# Patient Record
Sex: FEMALE | URBAN - METROPOLITAN AREA
[De-identification: names, ages, dates, MRNs, and addresses within clinical notes are randomized per-mention and may not be internally consistent; named-entity substitution may affect disease eponyms.]

---

## 2018-11-08 ENCOUNTER — HOSPITAL ENCOUNTER (EMERGENCY)
Facility: HOSPITAL | Age: 38
Discharge: HOME | End: 2018-11-08
Attending: EMERGENCY MEDICINE
Payer: MEDICAID

## 2018-11-08 VITALS
HEART RATE: 97 BPM | DIASTOLIC BLOOD PRESSURE: 78 MMHG | RESPIRATION RATE: 16 BRPM | TEMPERATURE: 99 F | WEIGHT: 140 LBS | SYSTOLIC BLOOD PRESSURE: 116 MMHG | OXYGEN SATURATION: 100 %

## 2018-11-08 DIAGNOSIS — M25.521 RIGHT ELBOW PAIN: Primary | ICD-10-CM

## 2018-11-08 DIAGNOSIS — M25.519 SHOULDER PAIN: ICD-10-CM
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
## 2018-11-08 DIAGNOSIS — M25.551 RIGHT HIP PAIN: ICD-10-CM

## 2018-11-08 LAB
B-HCG UR QL: NEGATIVE
CTP QC/QA: YES

## 2018-11-08 PROCEDURE — 81025 URINE PREGNANCY TEST: CPT | Performed by: PHYSICIAN ASSISTANT

## 2018-11-08 PROCEDURE — 25000003 PHARM REV CODE 250: Performed by: PHYSICIAN ASSISTANT

## 2018-11-08 PROCEDURE — 99284 EMERGENCY DEPT VISIT MOD MDM: CPT

## 2018-11-08 PROCEDURE — 99284 EMERGENCY DEPT VISIT MOD MDM: CPT | Mod: ,,, | Performed by: PHYSICIAN ASSISTANT

## 2018-11-08 RX ORDER — PREDNISONE 20 MG/1
20 TABLET ORAL DAILY
COMMUNITY

## 2018-11-08 RX ORDER — HYDROXYCHLOROQUINE SULFATE 200 MG/1
200 TABLET, FILM COATED ORAL DAILY
COMMUNITY

## 2018-11-08 RX ORDER — ACETAMINOPHEN 500 MG
1000 TABLET ORAL
Status: COMPLETED | OUTPATIENT
Start: 2018-11-08 | End: 2018-11-08

## 2018-11-08 RX ORDER — MYCOPHENOLATE MOFETIL 500 MG/1
TABLET ORAL 2 TIMES DAILY
COMMUNITY

## 2018-11-08 RX ORDER — ERGOCALCIFEROL 1.25 MG/1
50000 CAPSULE ORAL
COMMUNITY

## 2018-11-08 RX ORDER — NAPROXEN 500 MG/1
500 TABLET ORAL 2 TIMES DAILY WITH MEALS
Qty: 20 TABLET | Refills: 0 | Status: SHIPPED | OUTPATIENT
Start: 2018-11-08 | End: 2018-11-08 | Stop reason: ALTCHOICE

## 2018-11-08 RX ORDER — SULFAMETHOXAZOLE AND TRIMETHOPRIM 400; 80 MG/1; MG/1
1 TABLET ORAL
COMMUNITY

## 2018-11-08 RX ADMIN — ACETAMINOPHEN 1000 MG: 500 TABLET, FILM COATED ORAL at 06:11

## 2018-11-08 NOTE — ED PROVIDER NOTES
Encounter Date: 11/8/2018    SCRIBE #1 NOTE: I, Melania Chester, am scribing for, and in the presence of,  Dr. Evans. I have scribed the following portions of the note - the APC attestation.       History     Chief Complaint   Patient presents with    Fall     tripped and fell in surgery; right hip pain and right elbow pain     39 y/o AAF with history of mixed connective tissue disease presents to the ED c/o R shoulder pain, R elbow pain and R hip pain after a fall at 4:15pm.  She works at [employer] and was in the OR when she tripped on something on the ground landing onto her R side. She denies any head trauma or LOC. She reports her pain is 7/10, she has not taken any OTC medications prior to arrival. She denies any PSHx. Denies f/c, chest pain, SOB, neck pain, back pain, headache, changes in vision, numbness.       The history is provided by the patient.     Review of patient's allergies indicates:  No Known Allergies  Past Medical History:   Diagnosis Date    Lung disease      History reviewed. No pertinent surgical history.  History reviewed. No pertinent family history.  Social History     Tobacco Use    Smoking status: Never Smoker    Smokeless tobacco: Never Used   Substance Use Topics    Alcohol use: No     Frequency: Never    Drug use: No     Review of Systems   Constitutional: Negative for chills and fever.   HENT: Negative for congestion, rhinorrhea and sore throat.    Eyes: Negative for photophobia and visual disturbance.   Respiratory: Negative for shortness of breath.    Cardiovascular: Negative for chest pain.   Gastrointestinal: Negative for abdominal pain, constipation, diarrhea, nausea and vomiting.   Genitourinary: Negative for dysuria, hematuria and vaginal discharge.   Musculoskeletal: Positive for arthralgias, gait problem and myalgias. Negative for back pain, neck pain and neck stiffness.   Skin: Negative for rash and wound.   Neurological: Negative for light-headedness, numbness and  headaches.   Psychiatric/Behavioral: Negative for confusion.       Physical Exam     Initial Vitals [11/08/18 1634]   BP Pulse Resp Temp SpO2   116/78 97 16 98.5 °F (36.9 °C) 100 %      MAP       --         Physical Exam    Nursing note and vitals reviewed.  Constitutional: She appears well-developed and well-nourished. She is not diaphoretic. No distress.   HENT:   Head: Normocephalic and atraumatic.   Neck: Normal range of motion. Neck supple.   Cardiovascular: Normal rate, regular rhythm and normal heart sounds. Exam reveals no gallop and no friction rub.    No murmur heard.  Pulmonary/Chest: Breath sounds normal. She has no wheezes. She has no rhonchi. She has no rales.   Abdominal: Soft. Bowel sounds are normal. There is no tenderness. There is no rebound and no guarding.   Musculoskeletal:        Right shoulder: She exhibits tenderness and bony tenderness. She exhibits normal range of motion and normal strength.        Right elbow: She exhibits normal range of motion and no swelling. Tenderness found.        Right wrist: She exhibits no tenderness and no bony tenderness.        Right hip: She exhibits tenderness and bony tenderness. She exhibits normal range of motion and normal strength.        Right knee: She exhibits normal range of motion and no bony tenderness. No tenderness found.        Cervical back: She exhibits no tenderness and no bony tenderness.        Thoracic back: She exhibits no tenderness and no bony tenderness.        Lumbar back: She exhibits no tenderness and no bony tenderness.        Right upper arm: She exhibits tenderness and bony tenderness. She exhibits no deformity and no laceration.        Right forearm: She exhibits no tenderness and no bony tenderness.        Right upper leg: She exhibits no tenderness and no bony tenderness.        Right lower leg: She exhibits no tenderness and no bony tenderness.   Normal ROM, strength, sensation to bilateral upper and lower extremities. Able  to ambulate unassisted.    Neurological: She is alert and oriented to person, place, and time.   Skin: Skin is warm and dry. No rash noted. No erythema.   Psychiatric: She has a normal mood and affect.         ED Course   Procedures  Labs Reviewed   POCT URINE PREGNANCY          Imaging Results          X-Ray Shoulder Trauma Right (Final result)  Result time 11/08/18 18:26:08    Final result by Dimitry Blake MD (11/08/18 18:26:08)                 Impression:      No acute displaced fracture-dislocation identified.      Electronically signed by: Dimitry Blake MD  Date:    11/08/2018  Time:    18:26             Narrative:    EXAMINATION:  XR SHOULDER TRAUMA 3 VIEW RIGHT; XR HUMERUS 2 VIEW RIGHT; XR ELBOW COMPLETE 3 VIEW RIGHT    CLINICAL HISTORY:  Pain in unspecified shoulder; Unspecified fall, initial encounter    TECHNIQUE:  Three views right shoulder; two views right humerus; three views right elbow    COMPARISON:  Chest radiograph 08/11/2009    FINDINGS:  Bones are well mineralized. Alignment is within normal limits.  No displaced fracture, dislocation or destructive osseous process.  No large elbow joint effusion.  Joint spaces are maintained. No subcutaneous emphysema or radiodense retained foreign body.  No right apical pneumothorax.                               X-Ray Humerus 2 View Right (Final result)  Result time 11/08/18 18:26:08    Final result by Dimitry Blake MD (11/08/18 18:26:08)                 Impression:      No acute displaced fracture-dislocation identified.      Electronically signed by: Dimitry Blake MD  Date:    11/08/2018  Time:    18:26             Narrative:    EXAMINATION:  XR SHOULDER TRAUMA 3 VIEW RIGHT; XR HUMERUS 2 VIEW RIGHT; XR ELBOW COMPLETE 3 VIEW RIGHT    CLINICAL HISTORY:  Pain in unspecified shoulder; Unspecified fall, initial encounter    TECHNIQUE:  Three views right shoulder; two views right humerus; three views right elbow    COMPARISON:  Chest radiograph  08/11/2009    FINDINGS:  Bones are well mineralized. Alignment is within normal limits.  No displaced fracture, dislocation or destructive osseous process.  No large elbow joint effusion.  Joint spaces are maintained. No subcutaneous emphysema or radiodense retained foreign body.  No right apical pneumothorax.                               X-Ray Elbow Complete Right (Final result)  Result time 11/08/18 18:26:08    Final result by Dimitry Blake MD (11/08/18 18:26:08)                 Impression:      No acute displaced fracture-dislocation identified.      Electronically signed by: Dimitry Blake MD  Date:    11/08/2018  Time:    18:26             Narrative:    EXAMINATION:  XR SHOULDER TRAUMA 3 VIEW RIGHT; XR HUMERUS 2 VIEW RIGHT; XR ELBOW COMPLETE 3 VIEW RIGHT    CLINICAL HISTORY:  Pain in unspecified shoulder; Unspecified fall, initial encounter    TECHNIQUE:  Three views right shoulder; two views right humerus; three views right elbow    COMPARISON:  Chest radiograph 08/11/2009    FINDINGS:  Bones are well mineralized. Alignment is within normal limits.  No displaced fracture, dislocation or destructive osseous process.  No large elbow joint effusion.  Joint spaces are maintained. No subcutaneous emphysema or radiodense retained foreign body.  No right apical pneumothorax.                               X-Ray Hip 2 View Right (Final result)  Result time 11/08/18 18:28:29    Final result by Dimitry Blake MD (11/08/18 18:28:29)                 Impression:      No acute displaced fracture-dislocation identified.      Electronically signed by: Dimitry Blake MD  Date:    11/08/2018  Time:    18:28             Narrative:    EXAMINATION:  XR HIP 2 VIEW RIGHT    CLINICAL HISTORY:  fall; R hip pain;    TECHNIQUE:  AP view of the pelvis and frog leg lateral view of the right hip were performed.    COMPARISON:  None    FINDINGS:  Bones are well mineralized. Alignment is within normal limits.  No displaced fracture,  dislocation or destructive osseous process. Joint spaces are maintained. No subcutaneous emphysema or radiodense retained foreign body.  Pelvic phleboliths noted.                                 Medical Decision Making:   History:   Old Medical Records: I decided to obtain old medical records.  Clinical Tests:   Lab Tests: Ordered and Reviewed  Radiological Study: Ordered and Reviewed       APC / Resident Notes:   39 y/o AAF with history of mixed connective tissue disease presents to the ED c/o R shoulder pain, R elbow pain and R hip pain after a fall at 4:15pm. VSS. RRR. Lungs clear. Abdomen soft and nontender. Tenderness noted to the R shoulder, R humerus, R elbow, R hip. Normal ROM noted to the bilateral upper and lower extremities. No midline C, T, or L spine tenderness. Alert and oriented. She is able to ambulate unassisted. DDx includes but is not limited to fracture, dislocation, musculoskeletal pain. Will get xrays.     UPT negative.    Xray of the R shoulder, R humerus, R elbow, R hip with no acute fracture or dislocation.     I do not feel that she needs any further labs or imaging at this time. Stable for discharge.    She was discharged without any new prescriptions - will continue at home naproxen as prescribed.  She will follow up with her PCP.  All of the patient's questions were answered.  I reviewed the patient's chart, labs, and imaging.         Scribe Attestation:   Scribe #1: I performed the above scribed service and the documentation accurately describes the services I performed. I attest to the accuracy of the note.    Attending Attestation:     Physician Attestation Statement for NP/PA:   I discussed this assessment and plan of this patient with the NP/PA, but I did not personally examine the patient. The face to face encounter was performed by the NP/PA.                     Clinical Impression:   The primary encounter diagnosis was Right elbow pain. Diagnoses of Shoulder pain, Fall, and Right  hip pain were also pertinent to this visit.      Disposition:   Disposition: Discharged  Condition: Stable                        Giuliana Acosta PA-C  11/08/18 [id number]

## 2018-11-08 NOTE — ED TRIAGE NOTES
Patient arrives to ED with CC of trip and fall. Patient denies LOC. Patient reports pain to the right hip and elbow. Patient denies headache. Patient reports pins and needles sensation to the right hand.

## 2020-04-23 DIAGNOSIS — Z01.84 ANTIBODY RESPONSE EXAMINATION: ICD-10-CM

## 2020-05-23 DIAGNOSIS — Z01.84 ANTIBODY RESPONSE EXAMINATION: ICD-10-CM

## 2020-06-22 DIAGNOSIS — Z01.84 ANTIBODY RESPONSE EXAMINATION: ICD-10-CM

## 2020-07-22 DIAGNOSIS — Z01.84 ANTIBODY RESPONSE EXAMINATION: ICD-10-CM

## 2020-08-21 DIAGNOSIS — Z01.84 ANTIBODY RESPONSE EXAMINATION: ICD-10-CM

## 2020-09-20 DIAGNOSIS — Z01.84 ANTIBODY RESPONSE EXAMINATION: ICD-10-CM

## 2020-10-20 DIAGNOSIS — Z01.84 ANTIBODY RESPONSE EXAMINATION: ICD-10-CM

## 2020-11-19 DIAGNOSIS — Z01.84 ANTIBODY RESPONSE EXAMINATION: ICD-10-CM

## 2021-11-02 ENCOUNTER — IMMUNIZATION (OUTPATIENT)
Dept: INTERNAL MEDICINE | Facility: CLINIC | Age: 41
End: 2021-11-02
Payer: MEDICAID

## 2021-11-02 DIAGNOSIS — Z23 NEED FOR VACCINATION: Primary | ICD-10-CM

## 2021-11-02 PROCEDURE — 0003A COVID-19, MRNA, LNP-S, PF, 30 MCG/0.3 ML DOSE VACCINE: CPT | Mod: PBBFAC,CV19

## 2021-11-02 PROCEDURE — 91300 COVID-19, MRNA, LNP-S, PF, 30 MCG/0.3 ML DOSE VACCINE: CPT | Mod: PBBFAC
